# Patient Record
Sex: MALE | Race: WHITE | NOT HISPANIC OR LATINO | URBAN - METROPOLITAN AREA
[De-identification: names, ages, dates, MRNs, and addresses within clinical notes are randomized per-mention and may not be internally consistent; named-entity substitution may affect disease eponyms.]

---
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
## 2022-02-07 ENCOUNTER — RX ONLY (OUTPATIENT)
Age: 22
Setting detail: RX ONLY
End: 2022-02-07

## 2022-02-07 RX ORDER — TERBINAFINE HYDROCHLORIDE 250 MG/1
1 TABLET ORAL QDAY
Qty: 7 | Refills: 0 | Status: ERX | COMMUNITY
Start: 2022-02-07

## 2022-08-01 ENCOUNTER — TELEPHONE (OUTPATIENT)
Dept: FAMILY MEDICINE | Facility: CLINIC | Age: 22
End: 2022-08-01
Payer: COMMERCIAL

## 2022-08-01 NOTE — TELEPHONE ENCOUNTER
Pt's mom, Shelly is requesting a call back from  to talk about ADHD screening results for Pt    Please call at [phone]

## 2022-08-08 ENCOUNTER — OFFICE VISIT (OUTPATIENT)
Dept: FAMILY MEDICINE | Facility: CLINIC | Age: 22
End: 2022-08-08
Payer: COMMERCIAL

## 2022-08-08 ENCOUNTER — HOSPITAL ENCOUNTER (OUTPATIENT)
Dept: RADIOLOGY | Facility: CLINIC | Age: 22
Discharge: HOME | End: 2022-08-08
Attending: FAMILY MEDICINE
Payer: COMMERCIAL

## 2022-08-08 VITALS
TEMPERATURE: 97.6 F | HEART RATE: 95 BPM | HEIGHT: 75 IN | SYSTOLIC BLOOD PRESSURE: 130 MMHG | OXYGEN SATURATION: 98 % | DIASTOLIC BLOOD PRESSURE: 80 MMHG | BODY MASS INDEX: 28.97 KG/M2 | WEIGHT: 233 LBS

## 2022-08-08 DIAGNOSIS — R05.9 COUGH: ICD-10-CM

## 2022-08-08 DIAGNOSIS — F90.2 ATTENTION DEFICIT HYPERACTIVITY DISORDER (ADHD), COMBINED TYPE: ICD-10-CM

## 2022-08-08 DIAGNOSIS — F40.10 SOCIAL ANXIETY DISORDER: Primary | ICD-10-CM

## 2022-08-08 PROCEDURE — 71046 X-RAY EXAM CHEST 2 VIEWS: CPT

## 2022-08-08 PROCEDURE — 99213 OFFICE O/P EST LOW 20 MIN: CPT | Performed by: FAMILY MEDICINE

## 2022-08-08 PROCEDURE — 3008F BODY MASS INDEX DOCD: CPT | Performed by: FAMILY MEDICINE

## 2022-08-08 RX ORDER — DEXTROAMPHETAMINE SACCHARATE, AMPHETAMINE ASPARTATE MONOHYDRATE, DEXTROAMPHETAMINE SULFATE AND AMPHETAMINE SULFATE 5; 5; 5; 5 MG/1; MG/1; MG/1; MG/1
20 CAPSULE, EXTENDED RELEASE ORAL EVERY MORNING
Qty: 30 CAPSULE | Refills: 0 | Status: SHIPPED | OUTPATIENT
Start: 2022-08-08 | End: 2022-09-08 | Stop reason: SDUPTHER

## 2022-08-08 RX ORDER — BUPROPION HYDROCHLORIDE 150 MG/1
150 TABLET ORAL
COMMUNITY
Start: 2022-06-08 | End: 2022-09-16 | Stop reason: SDUPTHER

## 2022-08-08 ASSESSMENT — ENCOUNTER SYMPTOMS
DECREASED CONCENTRATION: 1
NERVOUS/ANXIOUS: 1
COUGH: 1

## 2022-08-08 NOTE — PROGRESS NOTES
[place] [address]  [phone]       Reason for visit:   Chief Complaint   Patient presents with   • Annual Exam      HPI   Davie Garcia is a 22 y.o. male who presents with ADHD and anxiety. He has been on Wellbutrin  for anxiety. He feels that has been been particularly helpful. He has been tested by Dr Michelle and she feels he is a good candidate for Adderral. He has had a cough for 1 month. Deep. Random.      Past Medical History:   Diagnosis Date   • Abdominal pain    • ADHD    • JESÚS positive    • Depression    • Dermatographia    • Mesenteric lymphadenopathy    • Projectile vomiting      History reviewed. No pertinent surgical history.  Social History     Tobacco Use   • Smoking status: Never Smoker   • Smokeless tobacco: Never Used   Vaping Use   • Vaping Use: Never used   Substance Use Topics   • Alcohol use: No   • Drug use: No      Social History     Social History Narrative    Do you wear your seatbelt? Yes    Do you have smoke detector in your home? Yes    Do you have a carbon monoxide detector in your home? 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  Current Outpatient Medications   Medication Sig Dispense Refill   • amphetamine-dextroamphetamine XR (ADDERALL XR) 20 mg 24 hr capsule Take 1 capsule (20 mg total) by mouth every morning. 30 capsule 0   • buPROPion XL (WELLBUTRIN XL) 150 mg 24 hr tablet Take 150 mg by mouth once daily.     • calcium carbonate (TUMS) 200 mg calcium (500 mg) chewable tablet Take 1 tablet by mouth daily.     • cetirizine (ZyrTEC) 10 mg tablet Take 10 mg by mouth daily.     • clotrimazole-betamethasone (LOTRISONE) 1-0.05 % cream APPLY A THIN LAYER TO GROIN AREA DAILY     • famotidine (PEPCID) 10 mg tablet  "Take 10 mg by mouth 2 (two) times a day.     • omeprazole (PriLOSEC) 10 mg capsule Take 20 mg by mouth daily.     • pantoprazole sodium (PROTONIX ORAL) Take by mouth.     • terbinafine (LamiSIL) 250 mg tablet TAKE ONE TAB BY MOUTH QD WITH FOOD       No current facility-administered medications for this visit.       Patient Active Problem List    Diagnosis Date Noted   • Attention deficit hyperactivity disorder (ADHD), combined type 08/08/2022   • Cough 08/08/2022   • Generalized abdominal pain 04/06/2021   • Encounter for preventive care 02/26/2020   • Chronic urticaria 02/26/2020   • Social anxiety disorder 06/25/2019         Review of Systems   Respiratory: Positive for cough.    Psychiatric/Behavioral: Positive for decreased concentration. The patient is nervous/anxious.      Objective   Vitals:    08/08/22 1424   BP: 130/80   BP Location: Right upper arm   Patient Position: Sitting   Pulse: 95   Temp: 36.4 °C (97.6 °F)   TempSrc: Oral   SpO2: 98%  Comment: room air   Weight: 106 kg (233 lb)   Height: 1.905 m (6' 3\")     Body mass index is 29.12 kg/m².  Physical Exam  Pulmonary:      Effort: Pulmonary effort is normal.      Breath sounds: Normal breath sounds.   Psychiatric:         Mood and Affect: Mood normal.         Behavior: Behavior normal.         Thought Content: Thought content normal.         Judgment: Judgment normal.         Procedures    Lab Results   Component Value Date    WBC 7.17 03/19/2021    HGB 15.6 03/19/2021    HCT 47.7 03/19/2021     03/19/2021    ALT 36 03/19/2021    AST 24 03/19/2021     03/19/2021    K 4.0 03/19/2021     03/19/2021    CREATININE 0.9 03/19/2021    BUN 15 03/19/2021    CO2 26 03/19/2021    GLUCOSE 109 (H) 03/19/2021    HEPCAB Nonreactive 03/19/2021           Assessment   Problem List Items Addressed This Visit        Respiratory    Cough     CXR           Relevant Orders    X-RAY CHEST 2 VIEWS       Other    Social anxiety disorder - Primary     Continue " Wellbutrin           Relevant Medications    buPROPion XL (WELLBUTRIN XL) 150 mg 24 hr tablet    amphetamine-dextroamphetamine XR (ADDERALL XR) 20 mg 24 hr capsule    Attention deficit hyperactivity disorder (ADHD), combined type     Adderall XR 20  Telemed in 1 month           Relevant Medications    buPROPion XL (WELLBUTRIN XL) 150 mg 24 hr tablet    amphetamine-dextroamphetamine XR (ADDERALL XR) 20 mg 24 hr capsule              Harry A. Frankel, MD  8/8/2022

## 2022-09-08 ENCOUNTER — TELEMEDICINE (OUTPATIENT)
Dept: FAMILY MEDICINE | Facility: CLINIC | Age: 22
End: 2022-09-08
Payer: COMMERCIAL

## 2022-09-08 DIAGNOSIS — F90.2 ATTENTION DEFICIT HYPERACTIVITY DISORDER (ADHD), COMBINED TYPE: Primary | ICD-10-CM

## 2022-09-08 DIAGNOSIS — F40.10 SOCIAL ANXIETY DISORDER: ICD-10-CM

## 2022-09-08 PROCEDURE — 99213 OFFICE O/P EST LOW 20 MIN: CPT | Mod: 95 | Performed by: FAMILY MEDICINE

## 2022-09-08 RX ORDER — DEXTROAMPHETAMINE SACCHARATE, AMPHETAMINE ASPARTATE, DEXTROAMPHETAMINE SULFATE AND AMPHETAMINE SULFATE 2.5; 2.5; 2.5; 2.5 MG/1; MG/1; MG/1; MG/1
TABLET ORAL
Qty: 30 TABLET | Refills: 0 | Status: SHIPPED | OUTPATIENT
Start: 2022-09-08 | End: 2023-01-13 | Stop reason: SDUPTHER

## 2022-09-08 RX ORDER — DEXTROAMPHETAMINE SACCHARATE, AMPHETAMINE ASPARTATE MONOHYDRATE, DEXTROAMPHETAMINE SULFATE AND AMPHETAMINE SULFATE 5; 5; 5; 5 MG/1; MG/1; MG/1; MG/1
20 CAPSULE, EXTENDED RELEASE ORAL EVERY MORNING
Qty: 30 CAPSULE | Refills: 0 | Status: SHIPPED | OUTPATIENT
Start: 2022-09-08 | End: 2023-01-13 | Stop reason: SDUPTHER

## 2022-09-08 RX ORDER — ALBUTEROL SULFATE 90 UG/1
INHALANT RESPIRATORY (INHALATION)
COMMUNITY
Start: 2022-08-12 | End: 2023-01-13

## 2022-09-08 ASSESSMENT — ENCOUNTER SYMPTOMS
SLEEP DISTURBANCE: 0
PALPITATIONS: 0
NERVOUS/ANXIOUS: 1
SHORTNESS OF BREATH: 0
DECREASED CONCENTRATION: 1

## 2022-09-08 NOTE — PROGRESS NOTES
"Verification of Patient Location:  The patient affirms they are currently located in the following state: [place]    Request for Consent:    Audio and Video Encounter   Silvana, my name is Harry A. Frankel, MD.  Before we proceed, can you please verify your identification by telling me your full name and date of birth?  Can you tell me who is in the room with you?    You and I are about to have a telemedicine check-in or visit because you have requested it.  This is a live video-conference.  I am a real person, speaking to you in real time.  There is no one else with me on the video-conference.  However, when we use ([employer], [employer], etc) it is important for you to know that the video-conference may not be secure or private.  I am not recording this conversation and I am asking you not to record it.  This telemedicine visit will be billed to your health insurance or you, if you are self-insured.  You understand you will be responsible for any copayments or coinsurances that apply to your telemedicine visit.  Communication platform used for this encounter:  [employer]     Before starting our telemedicine visit, I am required to get your consent for this virtual check-in or visit by telemedicine. Do you consent?      Patient Response to Request for Consent:  Yes      Visit Documentation:  Subjective     Patient ID: Davie Garcia is a 22 y.o. male.  2000      21 yo male with ADHD. He is doing well on the Adderall but it wears off and he \"crashes.\" He would like a dose adjustment. He is also on Wellbutrin for social anxiety. This has not fully kicked in at this time.      The following have been reviewed and updated as appropriate in this visit:   Tobacco  Allergies  Meds  Problems  Med Hx  Surg Hx  Fam Hx  Soc   Hx        Review of Systems   Respiratory: Negative for shortness of breath.    Cardiovascular: Negative for chest pain and palpitations.   Psychiatric/Behavioral: Positive for decreased " concentration. Negative for sleep disturbance. The patient is nervous/anxious.          Assessment/Plan   Diagnoses and all orders for this visit:    Attention deficit hyperactivity disorder (ADHD), combined type (Primary)  Assessment & Plan:  Adderal XR 20 in AM and Adderall 10 mg in PM  Telemed in 1 month      Social anxiety disorder  Assessment & Plan:  Continue meds  Consider adjustment in dose in the future      Other orders  -     amphetamine-dextroamphetamine XR (ADDERALL XR) 20 mg 24 hr capsule; Take 1 capsule (20 mg total) by mouth every morning.  -     dextroamphetamine-amphetamine (AdderalL) 10 mg tablet; Take one pill daily in the afternoon      Time Spent:  I spent 11 minutes on this date of service performing the following activities: obtaining history, entering orders and documenting.

## 2022-09-16 ENCOUNTER — TELEPHONE (OUTPATIENT)
Dept: FAMILY MEDICINE | Facility: CLINIC | Age: 22
End: 2022-09-16
Payer: COMMERCIAL

## 2022-09-16 RX ORDER — BUPROPION HYDROCHLORIDE 150 MG/1
150 TABLET ORAL
Qty: 30 TABLET | Refills: 0 | Status: SHIPPED | OUTPATIENT
Start: 2022-09-16 | End: 2022-10-10 | Stop reason: SDUPTHER

## 2022-09-16 NOTE — TELEPHONE ENCOUNTER
Pt's mother wondering why Pt's Wellbutrin was denied.  I don't see a denial, so not completely sure if we did or not.    But Pt is out of med and is requesting a refill be sent to [facility] in [place]

## 2022-10-10 RX ORDER — BUPROPION HYDROCHLORIDE 150 MG/1
150 TABLET ORAL
Qty: 90 TABLET | Refills: 0 | Status: SHIPPED | OUTPATIENT
Start: 2022-10-10 | End: 2023-01-13 | Stop reason: SDUPTHER

## 2022-10-12 ENCOUNTER — TELEMEDICINE (OUTPATIENT)
Dept: FAMILY MEDICINE | Facility: CLINIC | Age: 22
End: 2022-10-12
Payer: COMMERCIAL

## 2022-10-12 DIAGNOSIS — F90.2 ATTENTION DEFICIT HYPERACTIVITY DISORDER (ADHD), COMBINED TYPE: Primary | ICD-10-CM

## 2022-10-12 PROCEDURE — 99213 OFFICE O/P EST LOW 20 MIN: CPT | Mod: 95 | Performed by: FAMILY MEDICINE

## 2022-10-12 ASSESSMENT — ENCOUNTER SYMPTOMS
SHORTNESS OF BREATH: 0
SLEEP DISTURBANCE: 0
FATIGUE: 0

## 2022-10-12 NOTE — PROGRESS NOTES
Verification of Patient Location:  The patient affirms they are currently located in the following state: [place]    Request for Consent:    Audio and Video Encounter   Silvana, my name is Harry A. Frankel, MD.  Before we proceed, can you please verify your identification by telling me your full name and date of birth?  Can you tell me who is in the room with you?    You and I are about to have a telemedicine check-in or visit because you have requested it.  This is a live video-conference.  I am a real person, speaking to you in real time.  There is no one else with me on the video-conference.  However, when we use ([employer], [employer], etc) it is important for you to know that the video-conference may not be secure or private.  I am not recording this conversation and I am asking you not to record it.  This telemedicine visit will be billed to your health insurance or you, if you are self-insured.  You understand you will be responsible for any copayments or coinsurances that apply to your telemedicine visit.  Communication platform used for this encounter:  [employer] Video Visit ([employer] Video Client)     Before starting our telemedicine visit, I am required to get your consent for this virtual check-in or visit by telemedicine. Do you consent?      Patient Response to Request for Consent:  Yes      Visit Documentation:  Subjective     Patient ID: Davie Garcia is a 22 y.o. male.  2000      21 yo male with ADHD. He has been on Adderall XR 20 in AM and occasionally Addreral 10 mg in afternoon. He still has anxiety but he is focusing well. He is concerned that he may have elevated BP.      The following have been reviewed and updated as appropriate in this visit:   Allergies  Meds  Problems       Review of Systems   Constitutional: Negative for fatigue.   Respiratory: Negative for shortness of breath.    Cardiovascular: Negative for chest pain.   Psychiatric/Behavioral: Negative for sleep disturbance.          Assessment/Plan   Diagnoses and all orders for this visit:    Attention deficit hyperactivity disorder (ADHD), combined type (Primary)  Assessment & Plan:  He is focusing much better.  Continue Adderall  [facility]  RTO when he has finished school for the semester        Time Spent:  I spent 11 minutes on this date of service performing the following activities: obtaining history, entering orders and documenting.

## 2022-10-12 NOTE — ASSESSMENT & PLAN NOTE
He is focusing much better.  Continue Adderall  [facility]  RTO when he has finished school for the semester

## 2023-01-13 ENCOUNTER — OFFICE VISIT (OUTPATIENT)
Dept: FAMILY MEDICINE | Facility: CLINIC | Age: 23
End: 2023-01-13
Payer: COMMERCIAL

## 2023-01-13 VITALS
BODY MASS INDEX: 29.59 KG/M2 | OXYGEN SATURATION: 98 % | HEIGHT: 75 IN | HEART RATE: 71 BPM | TEMPERATURE: 97.6 F | WEIGHT: 238 LBS | SYSTOLIC BLOOD PRESSURE: 119 MMHG | DIASTOLIC BLOOD PRESSURE: 72 MMHG

## 2023-01-13 DIAGNOSIS — F90.2 ATTENTION DEFICIT HYPERACTIVITY DISORDER (ADHD), COMBINED TYPE: Primary | ICD-10-CM

## 2023-01-13 DIAGNOSIS — F40.10 SOCIAL ANXIETY DISORDER: ICD-10-CM

## 2023-01-13 DIAGNOSIS — R05.2 SUBACUTE COUGH: ICD-10-CM

## 2023-01-13 PROCEDURE — 99213 OFFICE O/P EST LOW 20 MIN: CPT | Performed by: FAMILY MEDICINE

## 2023-01-13 PROCEDURE — 3008F BODY MASS INDEX DOCD: CPT | Performed by: FAMILY MEDICINE

## 2023-01-13 RX ORDER — BUPROPION HYDROCHLORIDE 150 MG/1
150 TABLET ORAL
Qty: 90 TABLET | Refills: 0 | Status: SHIPPED | OUTPATIENT
Start: 2023-01-13 | End: 2023-01-18

## 2023-01-13 RX ORDER — DEXTROAMPHETAMINE SACCHARATE, AMPHETAMINE ASPARTATE MONOHYDRATE, DEXTROAMPHETAMINE SULFATE AND AMPHETAMINE SULFATE 5; 5; 5; 5 MG/1; MG/1; MG/1; MG/1
20 CAPSULE, EXTENDED RELEASE ORAL EVERY MORNING
Qty: 30 CAPSULE | Refills: 0 | Status: SHIPPED | OUTPATIENT
Start: 2023-01-13 | End: 2023-08-08 | Stop reason: SINTOL

## 2023-01-13 RX ORDER — DEXTROAMPHETAMINE SACCHARATE, AMPHETAMINE ASPARTATE, DEXTROAMPHETAMINE SULFATE AND AMPHETAMINE SULFATE 2.5; 2.5; 2.5; 2.5 MG/1; MG/1; MG/1; MG/1
TABLET ORAL
Qty: 30 TABLET | Refills: 0 | Status: SHIPPED | OUTPATIENT
Start: 2023-01-13 | End: 2023-08-08 | Stop reason: SINTOL

## 2023-01-13 ASSESSMENT — ENCOUNTER SYMPTOMS
NERVOUS/ANXIOUS: 1
DECREASED CONCENTRATION: 1
SHORTNESS OF BREATH: 0

## 2023-01-13 NOTE — PROGRESS NOTES
[place] [address]  [phone]       Reason for visit:   Chief Complaint   Patient presents with   • Follow-up   • Illness      HPI   Davie Garcia is a 22 y.o. male who presents with ADHD and social anxiety. He has been doing well on medications and wishes to continue. He has had a cough for a few months. He was not sick prior to onset. Not worse with activity.      Past Medical History:   Diagnosis Date   • Abdominal pain    • ADHD    • JESÚS positive    • Depression    • Dermatographia    • Mesenteric lymphadenopathy    • Projectile vomiting      History reviewed. No pertinent surgical history.  Social History     Tobacco Use   • Smoking status: Never   • Smokeless tobacco: Never   Vaping Use   • Vaping Use: Never used   Substance Use Topics   • Alcohol use: No   • Drug use: No      Social History     Social History Narrative    Do you wear your seatbelt? Yes    Do you have smoke detector in your home? Yes    Do you have a carbon monoxide detector in your home? 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  Current Outpatient Medications   Medication Sig Dispense Refill   • amphetamine-dextroamphetamine XR (ADDERALL XR) 20 mg 24 hr capsule Take 1 capsule (20 mg total) by mouth every morning. 30 capsule 0   • buPROPion XL (WELLBUTRIN XL) 150 mg 24 hr tablet Take 1 tablet (150 mg total) by mouth once daily. 90 tablet 0   • clotrimazole-betamethasone (LOTRISONE) 1-0.05 % cream APPLY A THIN LAYER TO GROIN AREA DAILY     • dextroamphetamine-amphetamine (AdderalL) 10 mg tablet Take one pill daily in the afternoon 30 tablet 0     No current facility-administered medications for this visit.       Patient Active Problem List    Diagnosis Date Noted   •  "Attention deficit hyperactivity disorder (ADHD), combined type 08/08/2022   • Cough 08/08/2022   • Generalized abdominal pain 04/06/2021   • Encounter for preventive care 02/26/2020   • Chronic urticaria 02/26/2020   • Social anxiety disorder 06/25/2019         Review of Systems   Respiratory: Negative for shortness of breath.    Psychiatric/Behavioral: Positive for decreased concentration. The patient is nervous/anxious.      Objective   Vitals:    01/13/23 1500   BP: 119/72   BP Location: Right upper arm   Patient Position: Sitting   Pulse: 71   Temp: 36.4 °C (97.6 °F)   TempSrc: Temporal   SpO2: 98%   Weight: 108 kg (238 lb)   Height: 1.905 m (6' 3\")     Body mass index is 29.75 kg/m².  Physical Exam  Constitutional:       Appearance: Normal appearance.   Cardiovascular:      Rate and Rhythm: Normal rate and regular rhythm.      Pulses: Normal pulses.      Heart sounds: Normal heart sounds.   Pulmonary:      Effort: Pulmonary effort is normal.      Breath sounds: Normal breath sounds.   Neurological:      Mental Status: He is alert.         Procedures    Lab Results   Component Value Date    WBC 7.17 03/19/2021    HGB 15.6 03/19/2021    HCT 47.7 03/19/2021     03/19/2021    ALT 36 03/19/2021    AST 24 03/19/2021     03/19/2021    K 4.0 03/19/2021     03/19/2021    CREATININE 0.9 03/19/2021    BUN 15 03/19/2021    CO2 26 03/19/2021    GLUCOSE 109 (H) 03/19/2021    HEPCAB Nonreactive 03/19/2021           Assessment   Problem List Items Addressed This Visit        Respiratory    Cough     Relatively minor  Chest x-ray  No treatment if x-ray is kadi.         Relevant Orders    X-RAY CHEST 2 VIEWS       Mental Health    Social anxiety disorder     Stable  Continue meds         Relevant Medications    dextroamphetamine-amphetamine (AdderalL) 10 mg tablet    buPROPion XL (WELLBUTRIN XL) 150 mg 24 hr tablet    amphetamine-dextroamphetamine XR (ADDERALL XR) 20 mg 24 hr capsule    Attention deficit " hyperactivity disorder (ADHD), combined type - Primary     Doing well on meds  Continue meds  CPE at time of next visit when he returns from school         Relevant Medications    dextroamphetamine-amphetamine (AdderalL) 10 mg tablet    buPROPion XL (WELLBUTRIN XL) 150 mg 24 hr tablet    amphetamine-dextroamphetamine XR (ADDERALL XR) 20 mg 24 hr capsule           Harry A. Frankel, MD  1/13/2023

## 2023-01-18 RX ORDER — BUPROPION HYDROCHLORIDE 150 MG/1
TABLET ORAL
Qty: 90 TABLET | Refills: 0 | Status: SHIPPED | OUTPATIENT
Start: 2023-01-18 | End: 2023-05-31

## 2023-05-27 ENCOUNTER — HOSPITAL ENCOUNTER (OUTPATIENT)
Dept: RADIOLOGY | Age: 23
Discharge: HOME | End: 2023-05-27
Attending: FAMILY MEDICINE
Payer: COMMERCIAL

## 2023-05-27 DIAGNOSIS — R05.2 SUBACUTE COUGH: ICD-10-CM

## 2023-05-27 PROCEDURE — 71046 X-RAY EXAM CHEST 2 VIEWS: CPT

## 2023-05-31 RX ORDER — BUPROPION HYDROCHLORIDE 150 MG/1
TABLET ORAL
Qty: 90 TABLET | Refills: 0 | Status: SHIPPED | OUTPATIENT
Start: 2023-05-31 | End: 2023-09-01

## 2023-08-08 ENCOUNTER — OFFICE VISIT (OUTPATIENT)
Dept: FAMILY MEDICINE | Facility: CLINIC | Age: 23
End: 2023-08-08
Payer: COMMERCIAL

## 2023-08-08 VITALS
SYSTOLIC BLOOD PRESSURE: 110 MMHG | WEIGHT: 236 LBS | TEMPERATURE: 97.6 F | OXYGEN SATURATION: 99 % | HEIGHT: 75 IN | HEART RATE: 86 BPM | BODY MASS INDEX: 29.34 KG/M2 | DIASTOLIC BLOOD PRESSURE: 64 MMHG

## 2023-08-08 DIAGNOSIS — F32.A DEPRESSION, UNSPECIFIED DEPRESSION TYPE: ICD-10-CM

## 2023-08-08 DIAGNOSIS — Z00.00 ENCOUNTER FOR PREVENTIVE CARE: Primary | ICD-10-CM

## 2023-08-08 PROBLEM — D89.89: Status: RESOLVED | Noted: 2021-03-12 | Resolved: 2023-08-08

## 2023-08-08 PROBLEM — J30.9 ALLERGIC RHINITIS DUE TO ALLERGEN: Status: RESOLVED | Noted: 2023-08-08 | Resolved: 2023-08-08

## 2023-08-08 PROBLEM — K76.0 STEATOSIS OF LIVER: Status: ACTIVE | Noted: 2023-08-08

## 2023-08-08 PROBLEM — I88.0 MESENTERIC ADENITIS: Status: ACTIVE | Noted: 2021-05-17

## 2023-08-08 PROBLEM — R10.13 EPIGASTRIC PAIN: Status: ACTIVE | Noted: 2021-03-12

## 2023-08-08 PROBLEM — L50.1 IDIOPATHIC URTICARIA: Status: ACTIVE | Noted: 2020-02-26

## 2023-08-08 PROBLEM — R05.9 COUGH: Status: RESOLVED | Noted: 2022-08-08 | Resolved: 2023-08-08

## 2023-08-08 PROBLEM — R63.4 WEIGHT LOSS: Status: RESOLVED | Noted: 2021-06-18 | Resolved: 2023-08-08

## 2023-08-08 PROBLEM — R11.2 NAUSEA & VOMITING: Status: ACTIVE | Noted: 2021-03-12

## 2023-08-08 PROBLEM — R10.13 EPIGASTRIC PAIN: Status: RESOLVED | Noted: 2021-03-12 | Resolved: 2023-08-08

## 2023-08-08 PROBLEM — D89.89: Status: ACTIVE | Noted: 2021-03-12

## 2023-08-08 PROBLEM — R63.4 WEIGHT LOSS: Status: ACTIVE | Noted: 2021-06-18

## 2023-08-08 PROBLEM — J30.9 ALLERGIC RHINITIS DUE TO ALLERGEN: Status: ACTIVE | Noted: 2023-08-08

## 2023-08-08 PROBLEM — I88.0 MESENTERIC ADENITIS: Status: RESOLVED | Noted: 2021-05-17 | Resolved: 2023-08-08

## 2023-08-08 PROBLEM — R11.2 NAUSEA & VOMITING: Status: RESOLVED | Noted: 2021-03-12 | Resolved: 2023-08-08

## 2023-08-08 PROBLEM — F40.10 SOCIAL ANXIETY DISORDER: Status: RESOLVED | Noted: 2019-06-25 | Resolved: 2023-08-08

## 2023-08-08 PROCEDURE — 90471 IMMUNIZATION ADMIN: CPT | Performed by: FAMILY MEDICINE

## 2023-08-08 PROCEDURE — 3008F BODY MASS INDEX DOCD: CPT | Performed by: FAMILY MEDICINE

## 2023-08-08 PROCEDURE — 90715 TDAP VACCINE 7 YRS/> IM: CPT | Performed by: FAMILY MEDICINE

## 2023-08-08 PROCEDURE — 99395 PREV VISIT EST AGE 18-39: CPT | Mod: 25 | Performed by: FAMILY MEDICINE

## 2023-08-08 ASSESSMENT — ENCOUNTER SYMPTOMS
CONSTIPATION: 0
BACK PAIN: 0
HEADACHES: 0
BRUISES/BLEEDS EASILY: 0
POLYDIPSIA: 0
DIARRHEA: 0
ARTHRALGIAS: 0
NERVOUS/ANXIOUS: 1
BLOOD IN STOOL: 0
SHORTNESS OF BREATH: 0
POLYPHAGIA: 0
PALPITATIONS: 0
FATIGUE: 0

## 2023-08-08 ASSESSMENT — PATIENT HEALTH QUESTIONNAIRE - PHQ9: SUM OF ALL RESPONSES TO PHQ9 QUESTIONS 1 & 2: 0

## 2023-08-08 NOTE — PROGRESS NOTES
[place] [address]  [phone]       Reason for visit:   Chief Complaint   Patient presents with   • Annual Exam      HPI   Davie Garcia is a 23 y.o. male who presents with CPE He has depression and is relatively well controlled on Wellbutrin. Not exercising regularly. No smoking. No drug us. Some alcohol . No drug use. Not sexually active.      Past Medical History:   Diagnosis Date   • Abdominal pain    • ADHD    • JESÚS positive    • Depression    • Dermatographia    • Mesenteric lymphadenopathy    • Projectile vomiting      History reviewed. No pertinent surgical history.  Social History     Tobacco Use   • Smoking status: Never   • Smokeless tobacco: Never   Vaping Use   • Vaping Use: Never used   Substance Use Topics   • Alcohol use: No   • Drug use: No      Social History     Social History Narrative    Do you wear your seatbelt? Yes    Do you have smoke detector in your home? Yes    Do you have a carbon monoxide detector in your home? 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  Current Outpatient Medications   Medication Sig Dispense Refill   • buPROPion XL (WELLBUTRIN XL) 150 mg 24 hr tablet TAKE 1 TABLET BY MOUTH EVERY DAY 90 tablet 0   • clotrimazole-betamethasone (LOTRISONE) 1-0.05 % cream APPLY A THIN LAYER TO GROIN AREA DAILY       No current facility-administered medications for this visit.       Patient Active Problem List    Diagnosis Date Noted   • Steatosis of liver 08/08/2023   • Attention deficit hyperactivity disorder (ADHD), combined type 08/08/2022   • Depression 05/17/2021   • Encounter for preventive care 02/26/2020   • Idiopathic urticaria 02/26/2020         Review of Systems   Constitutional: Negative for fatigue.   HENT:  "Negative for hearing loss.    Eyes: Negative for visual disturbance.   Respiratory: Negative for shortness of breath.    Cardiovascular: Negative for chest pain, palpitations and leg swelling.   Gastrointestinal: Negative for blood in stool, constipation and diarrhea.   Endocrine: Negative for polydipsia, polyphagia and polyuria.   Musculoskeletal: Negative for arthralgias and back pain.   Skin: Negative for rash.   Neurological: Negative for headaches.   Hematological: Does not bruise/bleed easily.   Psychiatric/Behavioral: The patient is nervous/anxious.      Objective   Vitals:    08/08/23 1431   BP: 110/64   BP Location: Right upper arm   Patient Position: Sitting   Pulse: 86   Temp: 36.4 °C (97.6 °F)   TempSrc: Oral   SpO2: 99%   Weight: 107 kg (236 lb)   Height: 1.905 m (6' 3\")     Body mass index is 29.5 kg/m².  Physical Exam  Constitutional:       Appearance: Normal appearance.   HENT:      Right Ear: Tympanic membrane and ear canal normal.      Left Ear: Tympanic membrane and ear canal normal.   Eyes:      Extraocular Movements: Extraocular movements intact.      Conjunctiva/sclera: Conjunctivae normal.      Pupils: Pupils are equal, round, and reactive to light.   Neck:      Thyroid: No thyromegaly.      Vascular: No carotid bruit.   Cardiovascular:      Rate and Rhythm: Normal rate and regular rhythm.      Pulses: Normal pulses.      Heart sounds: Normal heart sounds.   Pulmonary:      Effort: Pulmonary effort is normal.      Breath sounds: Normal breath sounds.   Abdominal:      General: Abdomen is flat. Bowel sounds are normal.      Palpations: Abdomen is soft. There is no hepatomegaly or splenomegaly.   Musculoskeletal:         General: Normal range of motion.      Cervical back: Normal range of motion.      Right lower leg: No edema.      Left lower leg: No edema.   Lymphadenopathy:      Cervical: No cervical adenopathy.   Skin:     General: Skin is warm and dry.   Neurological:      General: No focal " deficit present.      Mental Status: He is alert and oriented to person, place, and time.      Deep Tendon Reflexes: Reflexes normal.   Psychiatric:         Mood and Affect: Mood normal.         Behavior: Behavior normal.         Thought Content: Thought content normal.         Judgment: Judgment normal.         Procedures    Lab Results   Component Value Date    WBC 7.17 03/19/2021    HGB 15.6 03/19/2021    HCT 47.7 03/19/2021     03/19/2021    ALT 36 03/19/2021    AST 24 03/19/2021     03/19/2021    K 4.0 03/19/2021     03/19/2021    CREATININE 0.9 03/19/2021    BUN 15 03/19/2021    CO2 26 03/19/2021    GLUCOSE 109 (H) 03/19/2021    HEPCAB Nonreactive 03/19/2021           Assessment   Problem List Items Addressed This Visit        Mental Health    Depression     Continue meds    Not in counseling            Other    Encounter for preventive care - Primary     Labs next visit  Increase exercise and continue to eat healthy                Harry A. Frankel, MD  8/8/2023

## 2023-09-01 RX ORDER — BUPROPION HYDROCHLORIDE 150 MG/1
TABLET ORAL
Qty: 90 TABLET | Refills: 0 | Status: SHIPPED | OUTPATIENT
Start: 2023-09-01 | End: 2024-08-12

## 2024-08-12 ENCOUNTER — OFFICE VISIT (OUTPATIENT)
Dept: FAMILY MEDICINE | Facility: CLINIC | Age: 24
End: 2024-08-12
Payer: COMMERCIAL

## 2024-08-12 VITALS
WEIGHT: 232.4 LBS | TEMPERATURE: 97.5 F | BODY MASS INDEX: 31.48 KG/M2 | SYSTOLIC BLOOD PRESSURE: 122 MMHG | HEIGHT: 72 IN | OXYGEN SATURATION: 98 % | HEART RATE: 66 BPM | DIASTOLIC BLOOD PRESSURE: 80 MMHG

## 2024-08-12 DIAGNOSIS — Z00.00 ENCOUNTER FOR PREVENTIVE CARE: Primary | ICD-10-CM

## 2024-08-12 PROCEDURE — 3008F BODY MASS INDEX DOCD: CPT | Performed by: FAMILY MEDICINE

## 2024-08-12 PROCEDURE — 36415 COLL VENOUS BLD VENIPUNCTURE: CPT | Performed by: FAMILY MEDICINE

## 2024-08-12 PROCEDURE — 99395 PREV VISIT EST AGE 18-39: CPT | Mod: 25 | Performed by: FAMILY MEDICINE

## 2024-08-12 RX ORDER — PRENATAL VIT 91/IRON/FOLIC/DHA 28-975-200
COMBINATION PACKAGE (EA) ORAL 2 TIMES DAILY
Qty: 30 G | Refills: 1 | Status: SHIPPED | OUTPATIENT
Start: 2024-08-12 | End: 2025-08-12

## 2024-08-12 ASSESSMENT — ENCOUNTER SYMPTOMS
HEADACHES: 0
BLOOD IN STOOL: 0
BRUISES/BLEEDS EASILY: 0
FATIGUE: 0
CONSTIPATION: 0
DIARRHEA: 0
SHORTNESS OF BREATH: 0
POLYPHAGIA: 0
ARTHRALGIAS: 0
BACK PAIN: 0
POLYDIPSIA: 0
NERVOUS/ANXIOUS: 0
PALPITATIONS: 0

## 2024-08-12 NOTE — PROGRESS NOTES
[place] [address]  [phone]       Reason for visit:   Chief Complaint   Patient presents with    Annual Exam      HPI   Davie Garcia is a 24 y.o. male who presents with CPE. Not exercising. Diet is not great. Depression and ADD controlled without meds. He is a senior in college. Limited drinking. No drug use. No smoking.       Past Medical History:   Diagnosis Date    Abdominal pain     ADHD     JESÚS positive     Depression     Dermatographia     Mesenteric lymphadenopathy     Projectile vomiting      History reviewed. No pertinent surgical history.  Social History     Tobacco Use    Smoking status: Never    Smokeless tobacco: Never   Vaping Use    Vaping Use: Never used   Substance Use Topics    Alcohol use: No    Drug use: No      Social History     Social History Narrative    Do you wear your seatbelt? Yes    Do you have smoke detector in your home? Yes    Do you have a carbon monoxide detector in your home? Yes    Current Occupation? Student         Family History   Problem Relation Age of Onset    Other Biological Mother         congenital heart valve replaced    Diabetes Biological Father         type 2     Patient has no known allergies.  Current Outpatient Medications   Medication Sig Dispense Refill    terbinafine (LamISIL) 1 % cream Apply topically 2 (two) times a day. 30 g 1     No current facility-administered medications for this visit.       Patient Active Problem List    Diagnosis Date Noted    Steatosis of liver 08/08/2023    Attention deficit hyperactivity disorder (ADHD), combined type 08/08/2022    Depression 05/17/2021    Encounter for preventive care 02/26/2020    Idiopathic urticaria 02/26/2020         Review of Systems   Constitutional:  Negative for fatigue.   HENT:  Negative for hearing loss.    Eyes:  Negative for visual disturbance.   Respiratory:  Negative for shortness of breath.    Cardiovascular:  Negative  for chest pain, palpitations and leg swelling.   Gastrointestinal:  Negative for blood in stool, constipation and diarrhea.   Endocrine: Negative for polydipsia, polyphagia and polyuria.   Musculoskeletal:  Negative for arthralgias and back pain.   Skin:  Negative for rash.   Neurological:  Negative for headaches.   Hematological:  Does not bruise/bleed easily.   Psychiatric/Behavioral:  The patient is not nervous/anxious.      Objective   Vitals:    08/12/24 0754   BP: 122/80   BP Location: Left upper arm   Patient Position: Sitting   Pulse: 66   Temp: 36.4 °C (97.5 °F)   TempSrc: Oral   SpO2: 98%   Weight: 105 kg (232 lb 6.4 oz)   Height: 1.829 m (6')     Body mass index is 31.52 kg/m².  Physical Exam  Constitutional:       Appearance: Normal appearance.   HENT:      Right Ear: Tympanic membrane and ear canal normal.      Left Ear: Tympanic membrane and ear canal normal.   Eyes:      Extraocular Movements: Extraocular movements intact.      Conjunctiva/sclera: Conjunctivae normal.      Pupils: Pupils are equal, round, and reactive to light.   Neck:      Thyroid: No thyromegaly.      Vascular: No carotid bruit.   Cardiovascular:      Rate and Rhythm: Normal rate and regular rhythm.      Pulses: Normal pulses.      Heart sounds: Normal heart sounds.   Pulmonary:      Effort: Pulmonary effort is normal.      Breath sounds: Normal breath sounds.   Abdominal:      General: Abdomen is flat. Bowel sounds are normal.      Palpations: Abdomen is soft. There is no hepatomegaly or splenomegaly.   Musculoskeletal:         General: Normal range of motion.      Cervical back: Normal range of motion.      Right lower leg: No edema.      Left lower leg: No edema.   Lymphadenopathy:      Cervical: No cervical adenopathy.   Skin:     General: Skin is warm and dry.   Neurological:      General: No focal deficit present.      Mental Status: He is alert and oriented to person, place, and time.      Deep Tendon Reflexes: Reflexes  normal.   Psychiatric:         Mood and Affect: Mood normal.         Behavior: Behavior normal.         Thought Content: Thought content normal.         Judgment: Judgment normal.         Procedures    Lab Results   Component Value Date    WBC 7.17 03/19/2021    HGB 15.6 03/19/2021    HCT 47.7 03/19/2021     03/19/2021    ALT 36 03/19/2021    AST 24 03/19/2021     03/19/2021    K 4.0 03/19/2021     03/19/2021    CREATININE 0.9 03/19/2021    BUN 15 03/19/2021    CO2 26 03/19/2021    GLUCOSE 109 (H) 03/19/2021    HEPCAB Nonreactive 03/19/2021           Assessment   Problem List Items Addressed This Visit       Encounter for preventive care - Primary     Labs  Other services are up to date.   1 yr         Relevant Orders    CBC and Differential    Comprehensive metabolic panel    Lipid panel    Hemoglobin A1c           Harry A. Frankel, MD  8/12/2024

## 2024-08-13 LAB
ALBUMIN SERPL-MCNC: 4.8 G/DL (ref 4.3–5.2)
ALP SERPL-CCNC: 53 IU/L (ref 44–121)
ALT SERPL-CCNC: 31 IU/L (ref 0–44)
AST SERPL-CCNC: 23 IU/L (ref 0–40)
BASOPHILS # BLD AUTO: 0 X10E3/UL (ref 0–0.2)
BASOPHILS NFR BLD AUTO: 1 %
BILIRUB SERPL-MCNC: 0.6 MG/DL (ref 0–1.2)
BUN SERPL-MCNC: 16 MG/DL (ref 6–20)
BUN/CREAT SERPL: 17 (ref 9–20)
CALCIUM SERPL-MCNC: 10 MG/DL (ref 8.7–10.2)
CHLORIDE SERPL-SCNC: 99 MMOL/L (ref 96–106)
CHOLEST SERPL-MCNC: 198 MG/DL (ref 100–199)
CO2 SERPL-SCNC: 24 MMOL/L (ref 20–29)
CREAT SERPL-MCNC: 0.96 MG/DL (ref 0.76–1.27)
EGFRCR SERPLBLD CKD-EPI 2021: 115 ML/MIN/1.73
EOSINOPHIL # BLD AUTO: 0.1 X10E3/UL (ref 0–0.4)
EOSINOPHIL NFR BLD AUTO: 1 %
ERYTHROCYTE [DISTWIDTH] IN BLOOD BY AUTOMATED COUNT: 12.6 % (ref 11.6–15.4)
GLOBULIN SER CALC-MCNC: 2.8 G/DL (ref 1.5–4.5)
GLUCOSE SERPL-MCNC: 97 MG/DL (ref 70–99)
HBA1C MFR BLD: 5.9 % (ref 4.8–5.6)
HCT VFR BLD AUTO: 48.2 % (ref 37.5–51)
HDLC SERPL-MCNC: 31 MG/DL
HGB BLD-MCNC: 15.8 G/DL (ref 13–17.7)
IMM GRANULOCYTES # BLD AUTO: 0 X10E3/UL (ref 0–0.1)
IMM GRANULOCYTES NFR BLD AUTO: 0 %
LDLC SERPL CALC-MCNC: 131 MG/DL (ref 0–99)
LYMPHOCYTES # BLD AUTO: 2.4 X10E3/UL (ref 0.7–3.1)
LYMPHOCYTES NFR BLD AUTO: 37 %
MCH RBC QN AUTO: 27.5 PG (ref 26.6–33)
MCHC RBC AUTO-ENTMCNC: 32.8 G/DL (ref 31.5–35.7)
MCV RBC AUTO: 84 FL (ref 79–97)
MONOCYTES # BLD AUTO: 0.5 X10E3/UL (ref 0.1–0.9)
MONOCYTES NFR BLD AUTO: 8 %
NEUTROPHILS # BLD AUTO: 3.4 X10E3/UL (ref 1.4–7)
NEUTROPHILS NFR BLD AUTO: 53 %
PLATELET # BLD AUTO: 247 X10E3/UL (ref 150–450)
POTASSIUM SERPL-SCNC: 4.4 MMOL/L (ref 3.5–5.2)
PROT SERPL-MCNC: 7.6 G/DL (ref 6–8.5)
RBC # BLD AUTO: 5.74 X10E6/UL (ref 4.14–5.8)
SODIUM SERPL-SCNC: 139 MMOL/L (ref 134–144)
TRIGL SERPL-MCNC: 202 MG/DL (ref 0–149)
VLDLC SERPL CALC-MCNC: 36 MG/DL (ref 5–40)
WBC # BLD AUTO: 6.5 X10E3/UL (ref 3.4–10.8)

## 2025-08-10 SDOH — ECONOMIC STABILITY: TRANSPORTATION INSECURITY
IN THE PAST 12 MONTHS, HAS THE LACK OF TRANSPORTATION KEPT YOU FROM MEDICAL APPOINTMENTS OR FROM GETTING MEDICATIONS?: NO

## 2025-08-10 SDOH — ECONOMIC STABILITY: FOOD INSECURITY: WITHIN THE PAST 12 MONTHS, THE FOOD YOU BOUGHT JUST DIDN'T LAST AND YOU DIDN'T HAVE MONEY TO GET MORE.: NEVER TRUE

## 2025-08-10 SDOH — ECONOMIC STABILITY: TRANSPORTATION INSECURITY
IN THE PAST 12 MONTHS, HAS LACK OF TRANSPORTATION KEPT YOU FROM MEETINGS, WORK, OR FROM GETTING THINGS NEEDED FOR DAILY LIVING?: NO

## 2025-08-10 SDOH — ECONOMIC STABILITY: FOOD INSECURITY: WITHIN THE PAST 12 MONTHS, YOU WORRIED THAT YOUR FOOD WOULD RUN OUT BEFORE YOU GOT MONEY TO BUY MORE.: NEVER TRUE

## 2025-08-10 SDOH — ECONOMIC STABILITY: INCOME INSECURITY: IN THE LAST 12 MONTHS, WAS THERE A TIME WHEN YOU WERE NOT ABLE TO PAY THE MORTGAGE OR RENT ON TIME?: NO

## 2025-08-10 ASSESSMENT — SOCIAL DETERMINANTS OF HEALTH (SDOH): IN THE PAST 12 MONTHS, HAS THE ELECTRIC, GAS, OIL, OR WATER COMPANY THREATENED TO SHUT OFF SERVICE IN YOUR HOME?: NO

## 2025-08-13 ENCOUNTER — OFFICE VISIT (OUTPATIENT)
Dept: FAMILY MEDICINE | Facility: CLINIC | Age: 25
End: 2025-08-13
Payer: COMMERCIAL

## 2025-08-13 VITALS
HEART RATE: 75 BPM | SYSTOLIC BLOOD PRESSURE: 116 MMHG | WEIGHT: 226.4 LBS | TEMPERATURE: 97.5 F | OXYGEN SATURATION: 98 % | DIASTOLIC BLOOD PRESSURE: 80 MMHG | BODY MASS INDEX: 30.66 KG/M2 | HEIGHT: 72 IN

## 2025-08-13 DIAGNOSIS — B35.6 TINEA CRURIS: ICD-10-CM

## 2025-08-13 DIAGNOSIS — R73.03 PREDIABETES: ICD-10-CM

## 2025-08-13 DIAGNOSIS — Z00.00 ENCOUNTER FOR PREVENTIVE CARE: Primary | ICD-10-CM

## 2025-08-13 PROCEDURE — 3008F BODY MASS INDEX DOCD: CPT | Performed by: FAMILY MEDICINE

## 2025-08-13 PROCEDURE — 99395 PREV VISIT EST AGE 18-39: CPT | Performed by: FAMILY MEDICINE

## 2025-08-13 RX ORDER — KETOCONAZOLE 20 MG/G
CREAM TOPICAL DAILY
Qty: 30 G | Refills: 1 | Status: SHIPPED | OUTPATIENT
Start: 2025-08-13 | End: 2025-09-12

## 2025-08-13 ASSESSMENT — PATIENT HEALTH QUESTIONNAIRE - PHQ9: SUM OF ALL RESPONSES TO PHQ9 QUESTIONS 1 & 2: 0

## 2025-08-13 ASSESSMENT — ENCOUNTER SYMPTOMS
CONSTIPATION: 0
SHORTNESS OF BREATH: 0
BRUISES/BLEEDS EASILY: 0
NERVOUS/ANXIOUS: 0
BACK PAIN: 0
PALPITATIONS: 0
HEADACHES: 0
BLOOD IN STOOL: 0
FATIGUE: 0
ARTHRALGIAS: 0
POLYPHAGIA: 0
POLYDIPSIA: 0
DIARRHEA: 0

## 2025-08-28 LAB
BASOPHILS # BLD AUTO: 0 X10E3/UL (ref 0–0.2)
BASOPHILS NFR BLD AUTO: 1 %
EOSINOPHIL # BLD AUTO: 0.1 X10E3/UL (ref 0–0.4)
EOSINOPHIL NFR BLD AUTO: 2 %
ERYTHROCYTE [DISTWIDTH] IN BLOOD BY AUTOMATED COUNT: 12.7 % (ref 11.6–15.4)
HCT VFR BLD AUTO: 46.7 % (ref 37.5–51)
HGB BLD-MCNC: 15.9 G/DL (ref 13–17.7)
IMM GRANULOCYTES # BLD AUTO: 0 X10E3/UL (ref 0–0.1)
IMM GRANULOCYTES NFR BLD AUTO: 0 %
LYMPHOCYTES # BLD AUTO: 1.8 X10E3/UL (ref 0.7–3.1)
LYMPHOCYTES NFR BLD AUTO: 29 %
MCH RBC QN AUTO: 28.3 PG (ref 26.6–33)
MCHC RBC AUTO-ENTMCNC: 34 G/DL (ref 31.5–35.7)
MCV RBC AUTO: 83 FL (ref 79–97)
MONOCYTES # BLD AUTO: 0.5 X10E3/UL (ref 0.1–0.9)
MONOCYTES NFR BLD AUTO: 8 %
NEUTROPHILS # BLD AUTO: 3.7 X10E3/UL (ref 1.4–7)
NEUTROPHILS NFR BLD AUTO: 60 %
PLATELET # BLD AUTO: 252 X10E3/UL (ref 150–450)
RBC # BLD AUTO: 5.61 X10E6/UL (ref 4.14–5.8)
WBC # BLD AUTO: 6.2 X10E3/UL (ref 3.4–10.8)

## 2025-08-29 LAB
ALBUMIN SERPL-MCNC: 4.8 G/DL (ref 4.3–5.2)
ALP SERPL-CCNC: 57 IU/L (ref 44–121)
ALT SERPL-CCNC: 32 IU/L (ref 0–44)
AST SERPL-CCNC: 20 IU/L (ref 0–40)
BILIRUB SERPL-MCNC: 0.8 MG/DL (ref 0–1.2)
BUN SERPL-MCNC: 14 MG/DL (ref 6–20)
BUN/CREAT SERPL: 16 (ref 9–20)
CALCIUM SERPL-MCNC: 9.9 MG/DL (ref 8.7–10.2)
CHLORIDE SERPL-SCNC: 100 MMOL/L (ref 96–106)
CHOLEST SERPL-MCNC: 197 MG/DL (ref 100–199)
CO2 SERPL-SCNC: 22 MMOL/L (ref 20–29)
CREAT SERPL-MCNC: 0.88 MG/DL (ref 0.76–1.27)
EGFRCR SERPLBLD CKD-EPI 2021: 124 ML/MIN/1.73
GLOBULIN SER CALC-MCNC: 2.9 G/DL (ref 1.5–4.5)
GLUCOSE SERPL-MCNC: 85 MG/DL (ref 70–99)
HBA1C MFR BLD: 5.7 % (ref 4.8–5.6)
HDLC SERPL-MCNC: 30 MG/DL
LDLC SERPL CALC-MCNC: 136 MG/DL (ref 0–99)
POTASSIUM SERPL-SCNC: 4.5 MMOL/L (ref 3.5–5.2)
PROT SERPL-MCNC: 7.7 G/DL (ref 6–8.5)
SODIUM SERPL-SCNC: 138 MMOL/L (ref 134–144)
TRIGL SERPL-MCNC: 170 MG/DL (ref 0–149)
VLDLC SERPL CALC-MCNC: 31 MG/DL (ref 5–40)